# Patient Record
Sex: FEMALE | URBAN - METROPOLITAN AREA
[De-identification: names, ages, dates, MRNs, and addresses within clinical notes are randomized per-mention and may not be internally consistent; named-entity substitution may affect disease eponyms.]

---

## 2023-01-05 ENCOUNTER — OFFICE VISIT (OUTPATIENT)
Dept: URBAN - METROPOLITAN AREA CLINIC 88 | Facility: CLINIC | Age: 83
End: 2023-01-05
Payer: MEDICARE

## 2023-01-05 DIAGNOSIS — H52.03 HYPERMETROPIA, BILATERAL: ICD-10-CM

## 2023-01-05 DIAGNOSIS — H52.4 PRESBYOPIA: ICD-10-CM

## 2023-01-05 DIAGNOSIS — H25.813 COMBINED FORMS OF AGE-RELATED CATARACT, BILATERAL: ICD-10-CM

## 2023-01-05 DIAGNOSIS — H40.013 OPEN ANGLE WITH BORDERLINE FINDINGS, LOW RISK, BILATERAL: ICD-10-CM

## 2023-01-05 DIAGNOSIS — M35.01 SJOGREN SYNDROME WITH KERATOCONJUNCTIVITIS: Primary | ICD-10-CM

## 2023-01-05 PROCEDURE — 92004 COMPRE OPH EXAM NEW PT 1/>: CPT | Performed by: OPTOMETRIST

## 2023-01-05 ASSESSMENT — VISUAL ACUITY
OD: 20/25
OS: 20/25

## 2023-01-05 ASSESSMENT — INTRAOCULAR PRESSURE
OD: 13
OS: 13

## 2023-01-05 NOTE — IMPRESSION/PLAN
Impression: Presbyopia: H52.4. Plan: Reviewed ocular condition and reason for decreased accommodating potential. Patient understands that a bifocal or progressive design lens will be beneficial for near viewing and reading. Pt happy with current computer glasses for best near acuity.

## 2023-01-05 NOTE — IMPRESSION/PLAN
Impression: Sjogren syndrome with keratoconjunctivitis: M35.01. Plan: Discussed condition in detail with patient. Explained condition does not have a cure and will need artificial tears for maintenance. Recommended to continue artificial tears (Refresh or Systane brand) QID OU and Restasis BID OU. If symptoms persist, consider Omega 3 supplement, Tyrvaya, and/or punctal occlusion.

## 2023-01-05 NOTE — IMPRESSION/PLAN
Impression: Open angle with borderline findings, low risk, bilateral: H40.013. Plan: Ed pt on potential for glaucoma and risk of vision loss if left undetected. Recommend glaucoma workup with pachymetry, VF 24-2, and OCT RNFL, followed by IOP check with results. H/O RNFL 2/2022 with Dr. Howard Nuñez showing avg thicknesses in 70s OU and thinning inf OS.

## 2023-02-09 ENCOUNTER — OFFICE VISIT (OUTPATIENT)
Dept: URBAN - METROPOLITAN AREA CLINIC 88 | Facility: CLINIC | Age: 83
End: 2023-02-09
Payer: MEDICARE

## 2023-02-09 DIAGNOSIS — H40.013 OPEN ANGLE WITH BORDERLINE FINDINGS, LOW RISK, BILATERAL: Primary | ICD-10-CM

## 2023-02-09 DIAGNOSIS — M35.01 SJOGREN SYNDROME WITH KERATOCONJUNCTIVITIS: ICD-10-CM

## 2023-02-09 PROCEDURE — 76514 ECHO EXAM OF EYE THICKNESS: CPT | Performed by: OPTOMETRIST

## 2023-02-09 PROCEDURE — 99213 OFFICE O/P EST LOW 20 MIN: CPT | Performed by: OPTOMETRIST

## 2023-02-09 PROCEDURE — 92133 CPTRZD OPH DX IMG PST SGM ON: CPT | Performed by: OPTOMETRIST

## 2023-02-09 PROCEDURE — 92083 EXTENDED VISUAL FIELD XM: CPT | Performed by: OPTOMETRIST

## 2023-02-09 ASSESSMENT — INTRAOCULAR PRESSURE
OD: 13
OS: 13

## 2023-02-09 NOTE — IMPRESSION/PLAN
Impression: Open angle with borderline findings, low risk, bilateral: H40.013. Plan: Ed pt on potential for glaucoma and risk of vision loss if left undetected. Glaucoma workup with pachymetry, VF 24-2, and OCT RNFL performed today. H/O RNFL 2/2022 with Dr. Eugenio Doip showing avg thicknesses in 70s OU and thinning inf OS. Repeat OCT today showing no thinning OU; VF today: enlarged blind spot OD; Mild scattered superior losses OS with poor reliability. Pachy: avg thickness - no effect on risk. Ed pt no glaucoma at this time, but the risk does increase with age. Will continue to monitor.

## 2023-02-09 NOTE — IMPRESSION/PLAN
Impression: Sjogren syndrome with keratoconjunctivitis: M35.01. Plan: Pt understands condition does not have a cure and will need artificial tears for maintenance. Recommended to continue artificial tears (Refresh or Systane brand) QID OU and Restasis BID OU. If symptoms persist, consider Omega 3 supplement, Tyrvaya, and/or punctal occlusion.

## 2024-05-08 ENCOUNTER — OFFICE VISIT (OUTPATIENT)
Dept: URBAN - METROPOLITAN AREA CLINIC 88 | Facility: CLINIC | Age: 84
End: 2024-05-08
Payer: COMMERCIAL

## 2024-05-08 DIAGNOSIS — H25.813 COMBINED FORMS OF AGE-RELATED CATARACT, BILATERAL: ICD-10-CM

## 2024-05-08 DIAGNOSIS — H52.03 HYPERMETROPIA, BILATERAL: ICD-10-CM

## 2024-05-08 DIAGNOSIS — H43.813 VITREOUS DEGENERATION, BILATERAL: ICD-10-CM

## 2024-05-08 DIAGNOSIS — M35.01 SJOGREN SYNDROME WITH KERATOCONJUNCTIVITIS: ICD-10-CM

## 2024-05-08 DIAGNOSIS — H40.013 OPEN ANGLE WITH BORDERLINE FINDINGS, LOW RISK, BILATERAL: Primary | ICD-10-CM

## 2024-05-08 PROCEDURE — 92014 COMPRE OPH EXAM EST PT 1/>: CPT | Performed by: OPTOMETRIST

## 2024-05-08 ASSESSMENT — INTRAOCULAR PRESSURE
OD: 18
OS: 17

## 2025-05-14 ENCOUNTER — OFFICE VISIT (OUTPATIENT)
Dept: URBAN - METROPOLITAN AREA CLINIC 88 | Facility: CLINIC | Age: 85
End: 2025-05-14
Payer: COMMERCIAL

## 2025-05-14 DIAGNOSIS — H25.813 COMBINED FORMS OF AGE-RELATED CATARACT, BILATERAL: ICD-10-CM

## 2025-05-14 DIAGNOSIS — H40.013 OPEN ANGLE WITH BORDERLINE FINDINGS, LOW RISK, BILATERAL: ICD-10-CM

## 2025-05-14 DIAGNOSIS — M35.01 SJOGREN SYNDROME WITH KERATOCONJUNCTIVITIS: ICD-10-CM

## 2025-05-14 DIAGNOSIS — H43.813 VITREOUS DEGENERATION, BILATERAL: ICD-10-CM

## 2025-05-14 DIAGNOSIS — H52.03 HYPERMETROPIA, BILATERAL: Primary | ICD-10-CM

## 2025-05-14 PROCEDURE — 92014 COMPRE OPH EXAM EST PT 1/>: CPT | Performed by: OPTOMETRIST

## 2025-05-14 RX ORDER — CYCLOSPORINE 0.5 MG/ML
0.05 % EMULSION OPHTHALMIC
Qty: 180 | Refills: 0 | Status: ACTIVE
Start: 2025-05-14

## 2025-05-14 ASSESSMENT — INTRAOCULAR PRESSURE
OS: 17
OD: 17